# Patient Record
Sex: MALE | Race: WHITE | NOT HISPANIC OR LATINO | URBAN - METROPOLITAN AREA
[De-identification: names, ages, dates, MRNs, and addresses within clinical notes are randomized per-mention and may not be internally consistent; named-entity substitution may affect disease eponyms.]

---

## 2023-02-09 ENCOUNTER — INPATIENT HOSPITAL (OUTPATIENT)
Dept: URBAN - METROPOLITAN AREA HOSPITAL 130 | Facility: HOSPITAL | Age: 43
End: 2023-02-09
Payer: COMMERCIAL

## 2023-02-09 DIAGNOSIS — R93.3 ABNORMAL FINDINGS ON DIAGNOSTIC IMAGING OF OTHER PARTS OF DI: ICD-10-CM

## 2023-02-09 PROCEDURE — 99222 1ST HOSP IP/OBS MODERATE 55: CPT | Performed by: INTERNAL MEDICINE

## 2023-02-10 ENCOUNTER — INPATIENT HOSPITAL (OUTPATIENT)
Dept: URBAN - METROPOLITAN AREA HOSPITAL 130 | Facility: HOSPITAL | Age: 43
End: 2023-02-10
Payer: COMMERCIAL

## 2023-02-10 DIAGNOSIS — K21.00 GASTRO-ESOPHAGEAL REFLUX DISEASE WITH ESOPHAGITIS, WITHOUT B: ICD-10-CM

## 2023-02-10 DIAGNOSIS — K29.70 GASTRITIS, UNSPECIFIED, WITHOUT BLEEDING: ICD-10-CM

## 2023-02-10 DIAGNOSIS — R93.3 ABNORMAL FINDINGS ON DIAGNOSTIC IMAGING OF OTHER PARTS OF DI: ICD-10-CM

## 2023-02-10 PROCEDURE — 43239 EGD BIOPSY SINGLE/MULTIPLE: CPT | Performed by: INTERNAL MEDICINE

## 2024-07-22 ENCOUNTER — OFFICE (OUTPATIENT)
Dept: URBAN - METROPOLITAN AREA CLINIC 11 | Facility: CLINIC | Age: 44
End: 2024-07-22

## 2024-07-22 VITALS
HEIGHT: 76 IN | DIASTOLIC BLOOD PRESSURE: 103 MMHG | SYSTOLIC BLOOD PRESSURE: 131 MMHG | HEART RATE: 71 BPM | DIASTOLIC BLOOD PRESSURE: 95 MMHG | SYSTOLIC BLOOD PRESSURE: 140 MMHG | OXYGEN SATURATION: 98 % | WEIGHT: 194 LBS

## 2024-07-22 DIAGNOSIS — K61.1 RECTAL ABSCESS: ICD-10-CM

## 2024-07-22 PROCEDURE — 99204 OFFICE O/P NEW MOD 45 MIN: CPT | Performed by: NURSE PRACTITIONER

## 2024-07-22 RX ORDER — SODIUM PICOSULFATE, MAGNESIUM OXIDE, AND ANHYDROUS CITRIC ACID 12; 3.5; 1 G/175ML; G/175ML; MG/175ML
LIQUID ORAL
Qty: 1 | Refills: 0 | Status: COMPLETED
Start: 2024-07-22 | End: 2024-08-05

## 2024-07-22 NOTE — SERVICENOTES
history of recurrent per rectal abscess.  Will get him scheduled for colonoscopy.   He feels well today and has no complaints or concerns

## 2024-07-22 NOTE — SERVICEHPINOTES
Mr. John is a 44 year old male  referred by Dr. Osuna  for a perirectal abscess, colonoscopy consult. He was seen by Dr. 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 A CT scan at that time showed a necrotic appearing 4 x 3.5 cm mass in the left hepatic lobe concerning for metastasis or infection/abscess. He had a drain placed and repeat imaging revealed slight decrease in size. Abscessogram done in February 2023 showed no residual fluid collection, however there was a significant fistulous connection to the stomach.  He had EGD on 02/10/2023 that showed erythema in the GE junction, antrum and erythema and erosions in the duodenal bulb. The stomach was fully insufflated and there was no evidence of a fistula.   Pathology was benign. The inflammatory changes in the duodenal biopsy raised concerns for possible celiac disease. He was referred to Dr. Osuna earlier this year for recurrent hepatic abscesses. He also had had perianal/perirectal abscesses but never with documented fistula. He saw Dr. Osuna on 6/10 for recurrent perirectal abscess. At that time, he stated he had an abscess every 3 months since November 2022. He had seen Infectious Disease, Surgical Oncology and had been referred to Heme/onc for these issues. He had an MRI on 5/30/24 that did not show any fistulas.  he is doing well today and has no complaints or concerns. He states  he was in [place] for work in April of this year and started having fever and chills and went to the ER. He was found to have reoccurrence of liver abscess and he was treated with antibiotics. The abscess did resolve.   The first rach rectal abscess he had  in October or November 2022 was the size of a golf ball and required surgical removal.    All the other perirectal abscess as he has had were treated with antibiotics. His last perirectal abscess was 3-4 months ago  and he was treated with Augmentin. He was referred to Hematology in May but still has not seen them. His PCP sent in another referral last week. He has been cleared by Infectious Disease. He denies any nausea, vomiting, abdominal pain.

## 2024-08-05 ENCOUNTER — AMBULATORY SURGICAL CENTER (OUTPATIENT)
Dept: URBAN - METROPOLITAN AREA SURGERY 3 | Facility: SURGERY | Age: 44
End: 2024-08-05
Payer: COMMERCIAL

## 2024-08-05 ENCOUNTER — OFFICE (OUTPATIENT)
Dept: URBAN - METROPOLITAN AREA PATHOLOGY 12 | Facility: PATHOLOGY | Age: 44
End: 2024-08-05
Payer: COMMERCIAL

## 2024-08-05 VITALS
WEIGHT: 190 LBS | SYSTOLIC BLOOD PRESSURE: 124 MMHG | SYSTOLIC BLOOD PRESSURE: 125 MMHG | RESPIRATION RATE: 16 BRPM | HEIGHT: 76 IN | OXYGEN SATURATION: 100 % | DIASTOLIC BLOOD PRESSURE: 69 MMHG | SYSTOLIC BLOOD PRESSURE: 126 MMHG | HEIGHT: 76 IN | OXYGEN SATURATION: 100 % | HEART RATE: 70 BPM | HEART RATE: 66 BPM | DIASTOLIC BLOOD PRESSURE: 99 MMHG | SYSTOLIC BLOOD PRESSURE: 142 MMHG | RESPIRATION RATE: 17 BRPM | RESPIRATION RATE: 20 BRPM | OXYGEN SATURATION: 99 % | HEART RATE: 69 BPM | RESPIRATION RATE: 20 BRPM | SYSTOLIC BLOOD PRESSURE: 142 MMHG | SYSTOLIC BLOOD PRESSURE: 125 MMHG | OXYGEN SATURATION: 99 % | DIASTOLIC BLOOD PRESSURE: 83 MMHG | DIASTOLIC BLOOD PRESSURE: 69 MMHG | HEART RATE: 69 BPM | SYSTOLIC BLOOD PRESSURE: 124 MMHG | RESPIRATION RATE: 16 BRPM | RESPIRATION RATE: 17 BRPM | OXYGEN SATURATION: 98 % | TEMPERATURE: 97.8 F | HEART RATE: 70 BPM | OXYGEN SATURATION: 98 % | DIASTOLIC BLOOD PRESSURE: 79 MMHG | RESPIRATION RATE: 18 BRPM | DIASTOLIC BLOOD PRESSURE: 99 MMHG | DIASTOLIC BLOOD PRESSURE: 79 MMHG | DIASTOLIC BLOOD PRESSURE: 83 MMHG | SYSTOLIC BLOOD PRESSURE: 137 MMHG | TEMPERATURE: 97.8 F | HEART RATE: 68 BPM | SYSTOLIC BLOOD PRESSURE: 137 MMHG | HEART RATE: 64 BPM | SYSTOLIC BLOOD PRESSURE: 126 MMHG | TEMPERATURE: 98.6 F | WEIGHT: 190 LBS | TEMPERATURE: 98.6 F | HEART RATE: 68 BPM | HEART RATE: 66 BPM | HEART RATE: 64 BPM | RESPIRATION RATE: 18 BRPM

## 2024-08-05 DIAGNOSIS — R93.3 ABNORMAL FINDINGS ON DIAGNOSTIC IMAGING OF OTHER PARTS OF DI: ICD-10-CM

## 2024-08-05 DIAGNOSIS — D12.5 BENIGN NEOPLASM OF SIGMOID COLON: ICD-10-CM

## 2024-08-05 PROBLEM — K63.5 POLYP OF COLON: Status: ACTIVE | Noted: 2024-08-05

## 2024-08-05 PROCEDURE — 45385 COLONOSCOPY W/LESION REMOVAL: CPT | Performed by: INTERNAL MEDICINE

## 2024-08-05 PROCEDURE — 88305 TISSUE EXAM BY PATHOLOGIST: CPT | Performed by: PATHOLOGY

## 2024-08-07 LAB
GASTRO ONE PATHOLOGY: PDF REPORT: (no result)
GASTRO ONE PATHOLOGY: PDF REPORT: (no result)